# Patient Record
Sex: FEMALE | Race: WHITE | NOT HISPANIC OR LATINO | Employment: FULL TIME | URBAN - METROPOLITAN AREA
[De-identification: names, ages, dates, MRNs, and addresses within clinical notes are randomized per-mention and may not be internally consistent; named-entity substitution may affect disease eponyms.]

---

## 2023-02-24 ENCOUNTER — APPOINTMENT (OUTPATIENT)
Dept: GENERAL RADIOLOGY | Facility: CLINIC | Age: 63
End: 2023-02-24
Attending: EMERGENCY MEDICINE
Payer: COMMERCIAL

## 2023-02-24 ENCOUNTER — HOSPITAL ENCOUNTER (EMERGENCY)
Facility: CLINIC | Age: 63
Discharge: HOME | End: 2023-02-24
Attending: EMERGENCY MEDICINE | Admitting: EMERGENCY MEDICINE
Payer: COMMERCIAL

## 2023-02-24 VITALS
TEMPERATURE: 98.8 F | RESPIRATION RATE: 20 BRPM | SYSTOLIC BLOOD PRESSURE: 122 MMHG | BODY MASS INDEX: 24.27 KG/M2 | HEART RATE: 84 BPM | DIASTOLIC BLOOD PRESSURE: 65 MMHG | WEIGHT: 151 LBS | OXYGEN SATURATION: 97 % | HEIGHT: 66 IN

## 2023-02-24 DIAGNOSIS — R11.0 NAUSEA: ICD-10-CM

## 2023-02-24 DIAGNOSIS — R06.02 SHORTNESS OF BREATH: ICD-10-CM

## 2023-02-24 DIAGNOSIS — R07.9 CHEST PAIN, UNSPECIFIED TYPE: Primary | ICD-10-CM

## 2023-02-24 LAB
ANION GAP SERPL CALCULATED.3IONS-SCNC: 10 MMOL/L (ref 7–15)
ATRIAL RATE - MUSE: 84 BPM
BASOPHILS # BLD AUTO: 0 10E3/UL (ref 0–0.2)
BASOPHILS NFR BLD AUTO: 1 %
BUN SERPL-MCNC: 8 MG/DL (ref 8–23)
CALCIUM SERPL-MCNC: 10.2 MG/DL (ref 8.8–10.2)
CHLORIDE SERPL-SCNC: 100 MMOL/L (ref 98–107)
CREAT SERPL-MCNC: 0.62 MG/DL (ref 0.51–0.95)
D DIMER PPP FEU-MCNC: 0.4 UG/ML FEU (ref 0–0.5)
DEPRECATED HCO3 PLAS-SCNC: 31 MMOL/L (ref 22–29)
DIASTOLIC BLOOD PRESSURE - MUSE: NORMAL MMHG
EOSINOPHIL # BLD AUTO: 0.1 10E3/UL (ref 0–0.7)
EOSINOPHIL NFR BLD AUTO: 2 %
ERYTHROCYTE [DISTWIDTH] IN BLOOD BY AUTOMATED COUNT: 11.8 % (ref 10–15)
GFR SERPL CREATININE-BSD FRML MDRD: >90 ML/MIN/1.73M2
GLUCOSE SERPL-MCNC: 101 MG/DL (ref 70–99)
HCT VFR BLD AUTO: 45 % (ref 35–47)
HGB BLD-MCNC: 15 G/DL (ref 11.7–15.7)
HOLD SPECIMEN: NORMAL
HOLD SPECIMEN: NORMAL
IMM GRANULOCYTES # BLD: 0 10E3/UL
IMM GRANULOCYTES NFR BLD: 0 %
INTERPRETATION ECG - MUSE: NORMAL
LYMPHOCYTES # BLD AUTO: 2.1 10E3/UL (ref 0.8–5.3)
LYMPHOCYTES NFR BLD AUTO: 44 %
MCH RBC QN AUTO: 33.1 PG (ref 26.5–33)
MCHC RBC AUTO-ENTMCNC: 33.3 G/DL (ref 31.5–36.5)
MCV RBC AUTO: 99 FL (ref 78–100)
MONOCYTES # BLD AUTO: 0.4 10E3/UL (ref 0–1.3)
MONOCYTES NFR BLD AUTO: 9 %
NEUTROPHILS # BLD AUTO: 2.2 10E3/UL (ref 1.6–8.3)
NEUTROPHILS NFR BLD AUTO: 44 %
NRBC # BLD AUTO: 0 10E3/UL
NRBC BLD AUTO-RTO: 0 /100
P AXIS - MUSE: 65 DEGREES
PLATELET # BLD AUTO: 285 10E3/UL (ref 150–450)
POTASSIUM SERPL-SCNC: 3.8 MMOL/L (ref 3.4–5.3)
PR INTERVAL - MUSE: 164 MS
QRS DURATION - MUSE: 72 MS
QT - MUSE: 346 MS
QTC - MUSE: 408 MS
R AXIS - MUSE: 20 DEGREES
RBC # BLD AUTO: 4.53 10E6/UL (ref 3.8–5.2)
SODIUM SERPL-SCNC: 141 MMOL/L (ref 136–145)
SYSTOLIC BLOOD PRESSURE - MUSE: NORMAL MMHG
T AXIS - MUSE: 35 DEGREES
TROPONIN T SERPL HS-MCNC: <6 NG/L
VENTRICULAR RATE- MUSE: 84 BPM
WBC # BLD AUTO: 4.9 10E3/UL (ref 4–11)

## 2023-02-24 PROCEDURE — 85025 COMPLETE CBC W/AUTO DIFF WBC: CPT | Performed by: EMERGENCY MEDICINE

## 2023-02-24 PROCEDURE — 99285 EMERGENCY DEPT VISIT HI MDM: CPT | Mod: 25

## 2023-02-24 PROCEDURE — 36415 COLL VENOUS BLD VENIPUNCTURE: CPT | Performed by: EMERGENCY MEDICINE

## 2023-02-24 PROCEDURE — 84484 ASSAY OF TROPONIN QUANT: CPT | Performed by: EMERGENCY MEDICINE

## 2023-02-24 PROCEDURE — 93005 ELECTROCARDIOGRAM TRACING: CPT

## 2023-02-24 PROCEDURE — 71046 X-RAY EXAM CHEST 2 VIEWS: CPT

## 2023-02-24 PROCEDURE — 82435 ASSAY OF BLOOD CHLORIDE: CPT | Performed by: EMERGENCY MEDICINE

## 2023-02-24 PROCEDURE — 85379 FIBRIN DEGRADATION QUANT: CPT | Performed by: EMERGENCY MEDICINE

## 2023-02-24 ASSESSMENT — ENCOUNTER SYMPTOMS
LIGHT-HEADEDNESS: 1
NUMBNESS: 0
NAUSEA: 1
FEVER: 0
ABDOMINAL PAIN: 0
MYALGIAS: 1
COUGH: 0

## 2023-02-24 ASSESSMENT — ACTIVITIES OF DAILY LIVING (ADL): ADLS_ACUITY_SCORE: 35

## 2023-02-24 NOTE — ED PROVIDER NOTES
History     Chief Complaint:  Light headedness and shortness of breath    HPI   Gracie Wei is a 62 year old female with a history of colon polyps who presents with light headedness. Patient went to sleep around 2200 and was feeling fine at the time. She woke up at 0130 with indigestion and initially believed this was due to eating spicy pasta last night. She also noted nausea, shortness of breath, left arm and left chest discomfort, and light headedness. Patient got out of bed but did not feel improved after an hour. She took a CBD gummy as she felt this could be due to anxiety. Patient spoke with her  and decided to present to the ED for an evaluation.  Much of her symptoms are now improved as she denies any current pain, shortness of breath or nausea.  She denies numbness, tingling, fever, cough, recent illness, and abdominal pain. She also denies history of lung disease, heart disease, hypercholesteremia, diabetes, blood clots, recent surgeries, and recent immobilization. Patient does note recent left leg discomfort that she feels is likely due to pulling a muscle as she was walking and thinks she strained her leg on the ice a few days ago.  No leg swelling.    Independent Historian:   None - Patient Only    ROS:  Review of Systems   Constitutional: Negative for fever.   Respiratory: Negative for cough.    Cardiovascular: Negative for leg swelling.   Gastrointestinal: Positive for nausea. Negative for abdominal pain.   Musculoskeletal: Positive for myalgias.   Neurological: Positive for light-headedness. Negative for numbness.   All other systems reviewed and are negative.    Allergies:  Compazine     Medications:    ambien  malarone    Past Medical History:    Right thyroid nodule  WELL[place] II (cervical intraepithelial neoplasia II)  colon polyps  Chronic low back pain   Diffuse cystic mastopathy     Past Surgical History:    D&C   Tubal ligation   [place] tooth extraction  Tonsillectomy   bunionectomy   "    Family History:    Father: prostate cancer  Mother: uterine cancer endometrial cancer  Sister: breast cancer hypertension thyroid disease     Social History:  Patient presents with       Physical Exam     Patient Vitals for the past 24 hrs:   BP Temp Temp src Pulse Resp SpO2 Height Weight   02/24/23 0436 122/65 -- -- 84 20 97 % -- --   02/24/23 0430 122/65 -- -- 84 11 97 % -- --   02/24/23 0341 (!) 160/84 98.8  F (37.1  C) Oral 87 16 99 % 1.676 m (5' 6\") 68.5 kg (151 lb)        Physical Exam  General: Sitting up in bed  Eyes:  The pupils are equal and round    Conjunctivae and sclerae are normal  ENT:    Wearing a mask  Neck:  Normal range of motion  CV:  Regular rate, regular rhythm    Skin warm and well perfused     Radial pulses 2+ bilaterally  Resp:  Non labored breathing on room air    No tachypnea    No cough heard    Lungs clear bilaterally  GI:  Abdomen is soft, there is no rigidity    No distension    No rebound tenderness     No abdominal tenderness  MS:  Normal muscular tone  Skin:  No rash or acute skin lesions noted  Neuro:   Awake, alert.      Speech is normal and fluent.    Face is symmetric.     Moves all extremities equally  Psych: Normal affect.  Appropriate interactions.      Emergency Department Course   ECG  ECG results from 02/24/23   EKG 12 lead     Value    Systolic Blood Pressure     Diastolic Blood Pressure     Ventricular Rate 84    Atrial Rate 84    MT Interval 164    QRS Duration 72        QTc 408    P Axis 65    R AXIS 20    T Axis 35    Interpretation ECG      Sinus rhythm  Nonspecific ST and T wave abnormality  Abnormal ECG  No previous ECGs available          Imaging:  XR Chest 2 Views   Final Result   IMPRESSION: Negative chest.      Exercise Stress Echocardiogram    (Results Pending)      Report per radiology    Laboratory:  Labs Ordered and Resulted from Time of ED Arrival to Time of ED Departure   BASIC METABOLIC PANEL - Abnormal       Result Value    Sodium 141  "     Potassium 3.8      Chloride 100      Carbon Dioxide (CO2) 31 (*)     Anion Gap 10      Urea Nitrogen 8.0      Creatinine 0.62      Calcium 10.2      Glucose 101 (*)     GFR Estimate >90     CBC WITH PLATELETS AND DIFFERENTIAL - Abnormal    WBC Count 4.9      RBC Count 4.53      Hemoglobin 15.0      Hematocrit 45.0      MCV 99      MCH 33.1 (*)     MCHC 33.3      RDW 11.8      Platelet Count 285      % Neutrophils 44      % Lymphocytes 44      % Monocytes 9      % Eosinophils 2      % Basophils 1      % Immature Granulocytes 0      NRBCs per 100 WBC 0      Absolute Neutrophils 2.2      Absolute Lymphocytes 2.1      Absolute Monocytes 0.4      Absolute Eosinophils 0.1      Absolute Basophils 0.0      Absolute Immature Granulocytes 0.0      Absolute NRBCs 0.0     TROPONIN T, HIGH SENSITIVITY - Normal    Troponin T, High Sensitivity <6     D DIMER QUANTITATIVE - Normal    D-Dimer Quantitative 0.40        Emergency Department Course & Assessments:  Independent Interpretation (X-rays, CTs, rhythm strip):  I independently reviewed chest x-ray-narrow mediastinum        Assessments:  0354 I obtained history and examined the patient as noted above.   0447 I rechecked the patient and explained findings. Patient denies discomfort.    Disposition:  The patient was discharged to home.     Impression & Plan    Medical Decision Making:  Gracie Wei is a 62-year-old female who presented to the emergency department with lightheadedness.  She had multiple different symptoms at home and most of them have resolved now.  EKG shows sinus rhythm.  No acute ischemic changes.  No prior EKGs to compare to.  Initial troponin was undetectable.  Given high-sensitivity troponin pathway and the troponin being less than 6, repeat troponin is not indicated.  D-dimer normal.  Basic laboratory studies unremarkable.  Chest x-ray was clear.  Has a narrow mediastinum and given her symptoms, low suspicion for dissection and do not think further  work-up for this is indicated.  Given normal D-dimer, do not think further work-up for DVT or PE are indicated.  She is overall quite healthy and no risk factors for ACS but will do follow-up outpatient stress test for new symptoms for her.  Overall the worst of her symptoms have resolved and there is no continued chest pain or shortness of breath in the emergency department.  Symptoms not suggestive of stroke.  Do not think hospitalization is indicated.  Patient in agreement with plan for discharge.  Recommend follow-up with primary care provider.    Diagnosis:    ICD-10-CM    1. Chest pain, unspecified type  R07.9 Exercise Stress Echocardiogram      2. Nausea  R11.0       3. Shortness of breath  R06.02           Clark Huerta  2/24/2023   Nya Reyes MD Goertz, Maria Kristine, MD  02/24/23 0748

## 2023-02-24 NOTE — DISCHARGE INSTRUCTIONS
I did order a follow-up stress test for you - the heart center should call you to get it scheduled  Watch for worsening symptoms, especially during exercise  Follow up with primary care provider    Discharge Instructions  Chest Pain    You have been seen today for chest pain or discomfort.  At this time, your provider has found no signs that your chest pain is due to a serious or life-threatening condition, (or you have declined more testing and/or admission to the hospital). However, sometimes there is a serious problem that does not show up right away. Your evaluation today may not be complete and you may need further testing and evaluation.     Generally, every Emergency Department visit should have a follow-up clinic visit with either a primary or a specialty clinic/provider. Please follow-up as instructed by your emergency provider today.  Return to the Emergency Department if:  Your chest pain changes, gets worse, starts to happen more often, or comes with less activity.  You are newly short of breath.  You get very weak or tired.  You pass out or faint.  You have any new symptoms, like fever, cough, numb legs, or you cough up blood.  You have anything else that worries you.    Until you follow-up with your regular provider, please do the following:  Take one aspirin daily unless you have an allergy or are told not to by your provider.  If a stress test appointment has been made, go to the appointment.  If you have questions, contact your regular provider.  Follow-up with your regular provider/clinic as directed; this is very important.    If you were given a prescription for medicine here today, be sure to read all of the information (including the package insert) that comes with your prescription.  This will include important information about the medicine, its side effects, and any warnings that you need to know about.  The pharmacist who fills the prescription can provide more information and answer  questions you may have about the medicine.  If you have questions or concerns that the pharmacist cannot address, please call or return to the Emergency Department.       Remember that you can always come back to the Emergency Department if you are not able to see your regular provider in the amount of time listed above, if you get any new symptoms, or if there is anything that worries you.

## 2023-02-24 NOTE — ED TRIAGE NOTES
Indigestion from spicy pasta, wine at dinner.  Awoke with light headedness and SOB.  Thought it might be a panic attack and took CBD gummy     Triage Assessment     Row Name 02/24/23 0340       Triage Assessment (Adult)    Airway WDL WDL       Respiratory WDL    Respiratory WDL WDL       Skin Circulation/Temperature WDL    Skin Circulation/Temperature WDL WDL       Cardiac WDL    Cardiac WDL WDL       Peripheral/Neurovascular WDL    Peripheral Neurovascular WDL WDL       Cognitive/Neuro/Behavioral WDL    Cognitive/Neuro/Behavioral WDL WDL

## 2023-05-27 ENCOUNTER — HEALTH MAINTENANCE LETTER (OUTPATIENT)
Age: 63
End: 2023-05-27

## 2024-05-26 ENCOUNTER — HEALTH MAINTENANCE LETTER (OUTPATIENT)
Age: 64
End: 2024-05-26

## 2025-06-14 ENCOUNTER — HEALTH MAINTENANCE LETTER (OUTPATIENT)
Age: 65
End: 2025-06-14